# Patient Record
Sex: FEMALE | URBAN - METROPOLITAN AREA
[De-identification: names, ages, dates, MRNs, and addresses within clinical notes are randomized per-mention and may not be internally consistent; named-entity substitution may affect disease eponyms.]

---

## 2017-02-24 ENCOUNTER — IMPORTED ENCOUNTER (OUTPATIENT)
Dept: URBAN - METROPOLITAN AREA CLINIC 1 | Facility: CLINIC | Age: 47
End: 2017-02-24

## 2017-02-24 PROBLEM — H01.004: Noted: 2017-02-24

## 2017-02-24 PROBLEM — H10.021: Noted: 2017-02-24

## 2017-02-24 PROBLEM — H01.001: Noted: 2017-02-24

## 2017-02-24 PROBLEM — H10.023: Noted: 2017-02-24

## 2017-02-24 PROCEDURE — 92012 INTRM OPH EXAM EST PATIENT: CPT

## 2017-02-24 NOTE — PATIENT DISCUSSION
1.  Bacterial Conjunctivitis OD --The condition was discussed with the patient. Topical antibiotic drops were prescribed. The mechanism of transmission was explained. The patient was advised to wash his hands and use separate towels and bedding. Erx Tobradex QID OU 2. Blepharitis anterior type OU - Daily warm compresses and lid scrubs were recommended. Patient can also use KADI QHS apply to lids 3. Return for an appointment in 2 months for 10. with Dr. Kwadwo Jain.

## 2017-03-24 ENCOUNTER — IMPORTED ENCOUNTER (OUTPATIENT)
Dept: URBAN - METROPOLITAN AREA CLINIC 1 | Facility: CLINIC | Age: 47
End: 2017-03-24

## 2017-03-24 PROBLEM — H10.023: Noted: 2017-03-24

## 2017-03-24 PROBLEM — H16.143: Noted: 2017-03-24

## 2017-03-24 PROBLEM — H04.123: Noted: 2017-03-24

## 2017-03-24 PROCEDURE — 92012 INTRM OPH EXAM EST PATIENT: CPT

## 2017-03-24 NOTE — PATIENT DISCUSSION
1.  Dry Eyes w/ PEK OU --REC patient to cont using AT up to Q1-2H OU (samples of Retain and Refresh) and also use KADI OU patient can apply to lids and also in the eye. Will consider Lotemax **REC patient to D/C Tobradex **patient has a vitamin  deficiency 2. Return for an appointment in 3 weeks for 10. with Dr. Lian Valente.

## 2018-04-12 ENCOUNTER — IMPORTED ENCOUNTER (OUTPATIENT)
Dept: URBAN - METROPOLITAN AREA CLINIC 1 | Facility: CLINIC | Age: 48
End: 2018-04-12

## 2018-04-12 PROBLEM — H10.023: Noted: 2018-04-12

## 2018-04-12 PROBLEM — H04.123: Noted: 2018-04-12

## 2018-04-12 PROCEDURE — 92012 INTRM OPH EXAM EST PATIENT: CPT

## 2018-04-12 NOTE — PATIENT DISCUSSION
1.  Bacterial Conjunctivitis OU --The condition was discussed with the patient. Topical antibiotic drops were prescribed. The mechanism of transmission was explained. The patient was advised to wash her hands and use separate towels and bedding. ERx Tobradex QID OU2. Dry Eyes OU -- Recommended to patient to use Artificial Tears QID OU3. Return for an appointment in 1 month for 30. with Dr. Kimberly Thompson.

## 2022-04-09 ASSESSMENT — VISUAL ACUITY
OD_CC: 20/40+1
OS_CC: 20/30
OS_CC: 20/40+2
OS_CC: 20/30
OD_CC: 20/30
OD_CC: 20/40

## 2022-04-09 ASSESSMENT — TONOMETRY
OS_IOP_MMHG: 10
OD_IOP_MMHG: 10
OS_IOP_MMHG: 9
OD_IOP_MMHG: 9
OS_IOP_MMHG: 9
OD_IOP_MMHG: 9